# Patient Record
Sex: FEMALE | Race: BLACK OR AFRICAN AMERICAN | Employment: OTHER | URBAN - METROPOLITAN AREA
[De-identification: names, ages, dates, MRNs, and addresses within clinical notes are randomized per-mention and may not be internally consistent; named-entity substitution may affect disease eponyms.]

---

## 2018-04-17 ENCOUNTER — ANESTHESIA EVENT (OUTPATIENT)
Dept: ENDOSCOPY | Age: 73
End: 2018-04-17
Payer: MEDICARE

## 2018-04-17 RX ORDER — SODIUM CHLORIDE, SODIUM LACTATE, POTASSIUM CHLORIDE, CALCIUM CHLORIDE 600; 310; 30; 20 MG/100ML; MG/100ML; MG/100ML; MG/100ML
100 INJECTION, SOLUTION INTRAVENOUS CONTINUOUS
Status: CANCELLED | OUTPATIENT
Start: 2018-04-17

## 2018-04-18 ENCOUNTER — ANESTHESIA (OUTPATIENT)
Dept: ENDOSCOPY | Age: 73
End: 2018-04-18
Payer: MEDICARE

## 2018-05-22 RX ORDER — SODIUM CHLORIDE, SODIUM LACTATE, POTASSIUM CHLORIDE, CALCIUM CHLORIDE 600; 310; 30; 20 MG/100ML; MG/100ML; MG/100ML; MG/100ML
100 INJECTION, SOLUTION INTRAVENOUS CONTINUOUS
Status: CANCELLED | OUTPATIENT
Start: 2018-05-22

## 2018-05-23 ENCOUNTER — HOSPITAL ENCOUNTER (OUTPATIENT)
Age: 73
Setting detail: OUTPATIENT SURGERY
Discharge: HOME | End: 2018-05-23
Attending: INTERNAL MEDICINE | Admitting: INTERNAL MEDICINE
Payer: MEDICARE

## 2018-05-23 VITALS
HEIGHT: 62 IN | DIASTOLIC BLOOD PRESSURE: 65 MMHG | OXYGEN SATURATION: 100 % | SYSTOLIC BLOOD PRESSURE: 150 MMHG | WEIGHT: 122 LBS | BODY MASS INDEX: 22.45 KG/M2 | TEMPERATURE: 98 F | HEART RATE: 77 BPM | RESPIRATION RATE: 23 BRPM

## 2018-05-23 PROCEDURE — 74011250636 HC RX REV CODE- 250/636

## 2018-05-23 PROCEDURE — 76040000025: Performed by: INTERNAL MEDICINE

## 2018-05-23 PROCEDURE — 74011250636 HC RX REV CODE- 250/636: Performed by: ANESTHESIOLOGY

## 2018-05-23 PROCEDURE — 74011000250 HC RX REV CODE- 250

## 2018-05-23 PROCEDURE — 76060000032 HC ANESTHESIA 0.5 TO 1 HR: Performed by: INTERNAL MEDICINE

## 2018-05-23 PROCEDURE — 77030008969: Performed by: INTERNAL MEDICINE

## 2018-05-23 RX ORDER — SODIUM CHLORIDE 0.9 % (FLUSH) 0.9 %
5-10 SYRINGE (ML) INJECTION AS NEEDED
Status: DISCONTINUED | OUTPATIENT
Start: 2018-05-23 | End: 2018-05-23 | Stop reason: HOSPADM

## 2018-05-23 RX ORDER — PROPOFOL 10 MG/ML
INJECTION, EMULSION INTRAVENOUS AS NEEDED
Status: DISCONTINUED | OUTPATIENT
Start: 2018-05-23 | End: 2018-05-23 | Stop reason: HOSPADM

## 2018-05-23 RX ORDER — EPHEDRINE SULFATE 50 MG/ML
INJECTION, SOLUTION INTRAVENOUS AS NEEDED
Status: DISCONTINUED | OUTPATIENT
Start: 2018-05-23 | End: 2018-05-23 | Stop reason: HOSPADM

## 2018-05-23 RX ORDER — SODIUM CHLORIDE, SODIUM LACTATE, POTASSIUM CHLORIDE, CALCIUM CHLORIDE 600; 310; 30; 20 MG/100ML; MG/100ML; MG/100ML; MG/100ML
100 INJECTION, SOLUTION INTRAVENOUS CONTINUOUS
Status: DISCONTINUED | OUTPATIENT
Start: 2018-05-23 | End: 2018-05-23 | Stop reason: HOSPADM

## 2018-05-23 RX ORDER — PROPOFOL 10 MG/ML
INJECTION, EMULSION INTRAVENOUS
Status: DISCONTINUED | OUTPATIENT
Start: 2018-05-23 | End: 2018-05-23 | Stop reason: HOSPADM

## 2018-05-23 RX ADMIN — PROPOFOL 30 MG: 10 INJECTION, EMULSION INTRAVENOUS at 13:55

## 2018-05-23 RX ADMIN — PROPOFOL 40 MG: 10 INJECTION, EMULSION INTRAVENOUS at 13:52

## 2018-05-23 RX ADMIN — EPHEDRINE SULFATE 5 MG: 50 INJECTION, SOLUTION INTRAVENOUS at 14:20

## 2018-05-23 RX ADMIN — SODIUM CHLORIDE, SODIUM LACTATE, POTASSIUM CHLORIDE, AND CALCIUM CHLORIDE: 600; 310; 30; 20 INJECTION, SOLUTION INTRAVENOUS at 13:39

## 2018-05-23 RX ADMIN — EPHEDRINE SULFATE 5 MG: 50 INJECTION, SOLUTION INTRAVENOUS at 14:18

## 2018-05-23 RX ADMIN — SODIUM CHLORIDE, SODIUM LACTATE, POTASSIUM CHLORIDE, AND CALCIUM CHLORIDE 100 ML/HR: 600; 310; 30; 20 INJECTION, SOLUTION INTRAVENOUS at 12:36

## 2018-05-23 RX ADMIN — PROPOFOL 100 MCG/KG/MIN: 10 INJECTION, EMULSION INTRAVENOUS at 13:52

## 2018-05-23 NOTE — OP NOTES
Gastroenterology Procedure Note              Procedure:  Endoscopic ultrasound with Doppler     Date of Procedure:  5/23/2018    Patient:  Theodore Griffin     1945    Indication:  Dilated common bile duct    Sedation:  MAC    Pre-Procedure Physical Exam:    Mental status:  alert and oriented  Airway:  normal oropharyngeal airway and neck mobility  CV:  regular rate and rhythm  Respiratory:  clear to auscultation    Procedure:  A History and Physical has been performed, and patient medication allergies have been  reviewed. Risks of perforation, hemorrhage, adverse drug reaction, and aspiration were discussed. Informed consent was obtained for the procedure, including sedation. The patient was placed in the left lateral decubitus position. The heart rate, oxygen saturations, blood pressure, and response to care were monitored throughout the procedure. The linear echoendoscope was passed through the mouth and advanced under direct vision to the distal duodenum. As the scope was slowly withdrawn, detailed endoscopic images were obtained from the surrounding organs. The patient tolerated the procedure well. Findings:     ENDOSCOPIC FINDINGS:  Limited views of the mucosa with the echoendoscope reveals no gross abnormalities of the stomach or proximal duodenum. The ampulla is well-visualized endoscopically and appears normal.    PANCREAS:  The pancreas is well-visualized from head to tail. There is no evidence of chronic pancreatitis. No cysts or masses are visualized. The main pancreatic duct is of normal caliber with a smooth, regular course, measuring up to 3 mm in the head, 2 mm in the body and 1 mm in the tail. Pancreas divisum is not seen. BILIARY TREE: The gallbladder is normal.The common bile duct is well-visualized from its insertion in the ampulla to the bifurcation of right and left hepatic ducts.  It is dilated, measuring up to 12 mm maximally with an abrupt change in caliber distally and return to normal caliber just proximal to the ampulla. This may represent a tight angulation as there is no smooth stricture or extrinsic compression. There are no intraductal stones, sludge or debris. The ampulla appears normal endosonographically. OTHER ORGANS:  Views of the left lobe of the liver demonstrate intrahepatic biliary dilatation but no solid mass lesions. There is no ascites in the upper abdomen. The left adrenal gland appears normal. The major vascular structures including the portal vein, splenic vessels and celiac artery appear unremarkable. There are no pathologically enlarged posterior mediastinal or upper abdominal lymph nodes. Specimen:  No    Estimated Blood Loss:  None    Implant:  None           Impression:    1. Dilated common bile duct. There is an abrupt change in caliber without stone, mass or extrinsic compression. This could represent a focal stricture or a tight angulation. Plan:  1. Resume diet and current medications. 2. Will check LFTs at follow up. 3. Return to office in 1-2 months for ongoing care.     Signed:  Janell Keating MD  5/23/2018  2:20 PM

## 2018-05-23 NOTE — H&P
History and Physical for Endoscopic Ultrasound             Date: 5/23/2018     History of Present Illness:  Patient presents to undergo endoscopic ultrasound for evaluation of a dilated common bile duct seen on abdominal imaging. Patient denies any diarrhea, constipation, visible GI bleeding, fevers or chills. Past Medical History:   Diagnosis Date    Anticoagulated on Coumadin     Arrhythmia     a- fib-- followed by dr. Earl Alvares--- in [place]    Borderline diabetes     sqbs avg am----- diet controlled --- no meds    GERD (gastroesophageal reflux disease)     controlled with med    Hypercholesterolemia     Hypertension     controlled with med    Self-catheterizes urinary bladder     at least 2 times daily    Urinary retention     since back surg 2/2018-- has to self cath     Past Surgical History:   Procedure Laterality Date    HX DILATION AND CURETTAGE      HX LAP CHOLECYSTECTOMY  25 yrs ago    HX OTHER SURGICAL      egd and colonoscopy    HX TUBAL LIGATION      NEUROLOGICAL PROCEDURE UNLISTED  02/2018 at [facility]    back surg      No family history on file. Social History   Substance Use Topics    Smoking status: Never Smoker    Smokeless tobacco: Never Used    Alcohol use No        Allergies   Allergen Reactions    Flexeril [Cyclobenzaprine] Palpitations    Lortab [Hydrocodone-Acetaminophen] Palpitations    Phenergan [Promethazine] Other (comments)     hallucinates    Protonix [Pantoprazole] Other (comments)     dizziness    Tramadol Nausea and Vomiting    Tylenol [Acetaminophen] Palpitations     No current facility-administered medications for this encounter. Review of Systems:  A detailed 10 organ review of systems is obtained with pertinent positives as listed in the History of Present Illness. All others are negative. Objective:     Physical Exam:  There were no vitals taken for this visit. General:  Alert and oriented.   Heart: Regular rate and rhythm  Lungs: Clear to auscultation bilaterally  Abdomen: Soft, nontender, nondistended    Impression/Plan:     Proceed with EUS as planned. I have discussed with the patient the technique, benefits, alternatives, and risks of the procedure, including medication reaction, immediate or delayed bleeding, or perforation of the gastrointestinal tract.     Signed By: aCitlyn Landers MD     May 23, 2018

## 2018-05-23 NOTE — DISCHARGE INSTRUCTIONS
Gastrointestinal Esophagogastroduodenoscopy (EGD)/ Endoscopic Ultrasound(EUS)- Upper Exam Discharge Instructions    1. Call Dr. Matt Palmer  for any problems or questions. 2. Contact the doctor's office for follow up appointment as directed. 3. Medication may cause drowsiness for several hours, therefore, do not drive or operate machinery for remainder of the day. 4. No alcohol today. 5. Ordinarily, you may resume regular diet and activity after exam unless otherwise specified by your physician. 6. For mild soreness in your throat you may use Cepacol throat lozenges or warm  salt-water gargles as needed. Any additional instructions:   1. Resume diet and current medications. 2. Please call Dr. Chris Gill office to schedule a follow up visit for 1-2 months. Instructions given to Davian Pedersen and other family members.

## 2018-05-23 NOTE — ROUTINE PROCESS
VSS. Discharge instructions reviewed with patient and sister and copy of instructions sent home with patient. Dr. Sima Perales and Dr. Tori Wright spoke with patient and sister prior to discharge. Questions answered. Discharged via car, wheeled out by [employer]. IV discontinued prior to discharge. Personal items with patient at discharge: clothing, dentures, and glasses.

## 2018-05-23 NOTE — IP AVS SNAPSHOT
[address] 
[phone] Patient: Connor Corcoran MRN: [id number] About your hospitalization You were admitted on:  May 23, 2018 You last received care in the:  [facility] You were discharged on:  May 23, 2018 Why you were hospitalized Your primary diagnosis was:  Not on File Follow-up Information None Discharge Orders None A check atilio indicates which time of day the medication should be taken. My Medications ASK your doctor about these medications Instructions Each Dose to Equal  
 Morning Noon Evening Bedtime  
 amLODIPine 5 mg tablet Commonly known as:  Amanuel Sly Your last dose was: Your next dose is: Take 5 mg by mouth nightly. Takes every other evening 5 mg  
    
   
   
   
  
 aspirin 81 mg chewable tablet Your last dose was: Your next dose is: Take 81 mg by mouth daily. 81 mg  
    
   
   
   
  
 COLACE 100 mg capsule Generic drug:  docusate sodium Your last dose was: Your next dose is: Take 200 mg by mouth nightly. 200 mg  
    
   
   
   
  
 hydroCHLOROthiazide 12.5 mg tablet Commonly known as:  HYDRODIURIL Your last dose was: Your next dose is: Take 12.5 mg by mouth daily. 12.5 mg  
    
   
   
   
  
 LIPITOR 20 mg tablet Generic drug:  atorvastatin Your last dose was: Your next dose is: Take 20 mg by mouth daily. 20 mg MIRALAX 17 gram/dose powder Generic drug:  polyethylene glycol Your last dose was: Your next dose is: Take 17 g by mouth daily. 17 g  
    
   
   
   
  
 tamsulosin 0.4 mg capsule Commonly known as:  FLOMAX Your last dose was: Your next dose is: Take 0.4 mg by mouth daily.   
 0.4 mg  
 TOPROL  mg tablet Generic drug:  metoprolol succinate Your last dose was: Your next dose is: Take 100 mg by mouth daily. 100 mg  
    
   
   
   
  
 warfarin 5 mg tablet Commonly known as:  COUMADIN Your last dose was: Your next dose is: Take 5 mg by mouth nightly. Takes 5 mg daily except on mon and fri then she takes 7.5 mg---- stopped 5/18/18 per dr Mert Gil 5 mg Discharge Instructions Gastrointestinal Esophagogastroduodenoscopy (EGD)/ Endoscopic Ultrasound(EUS)- Upper Exam Discharge Instructions 1. Call Dr. Brad Rivera  for any problems or questions. 2. Contact the doctor's office for follow up appointment as directed. 3. Medication may cause drowsiness for several hours, therefore, do not drive or operate machinery for remainder of the day. 4. No alcohol today. 5. Ordinarily, you may resume regular diet and activity after exam unless otherwise specified by your physician. 6. For mild soreness in your throat you may use Cepacol throat lozenges or warm  salt-water gargles as needed. Any additional instructions: 1. Resume diet and current medications. 2. Please call Dr. Hugo Phalen office to schedule a follow up visit for 1-2 months. Instructions given to Killian Hermosillo and other family members. Introducing [facility]! [facility] introduces [employer] patient portal. Now you can access parts of your medical record, email your doctor's office, and request medication refills online. 1. In your internet browser, go to [email or web address] 2. Click on the First Time User? Click Here link in the Sign In box. You will see the New Member Sign Up page. 3. Enter your [employer]os Access Code exactly as it appears below. You will not need to use this code after youve completed the sign-up process.  If you do not sign up before the expiration date, you must request a new code. · Sensser Access Code: [email or web address] Expires: 7/15/2018 12:43 PM 
 
4. Enter the last four digits of your Social Security Number (xxxx) and Date of Birth (mm/dd/yyyy) as indicated and click Submit. You will be taken to the next sign-up page. 5. Create a [employer]t ID. This will be your Sensser login ID and cannot be changed, so think of one that is secure and easy to remember. 6. Create a Sensser password. You can change your password at any time. 7. Enter your Password Reset Question and Answer. This can be used at a later time if you forget your password. 8. Enter your e-mail address. You will receive e-mail notification when new information is available in [address] 9. Click Sign Up. You can now view and download portions of your medical record. 10. Click the Download Summary menu link to download a portable copy of your medical information. If you have questions, please visit the Frequently Asked Questions section of the Sensser website. Remember, Sensser is NOT to be used for urgent needs. For medical emergencies, dial 911. Now available from your iPhone and Android! Introducing Adonis Moore As a [facility] patient, I wanted to make you aware of our electronic visit tool called Adonis Johnnyorlandolucia. [facility] 24/7 allows you to connect within minutes with a medical provider 24 hours a day, seven days a week via a mobile device or tablet or logging into a secure website from your computer. You can access Adonis Moore from anywhere in the [place].  
 
A virtual visit might be right for you when you have a simple condition and feel like you just dont want to get out of bed, or cant get away from work for an appointment, when your regular [facility] provider is not available (evenings, weekends or holidays), or when youre out of town and need minor care. Electronic visits cost only $49 and if the [employer] 24/7 provider determines a prescription is needed to treat your condition, one can be electronically transmitted to a nearby pharmacy*. Please take a moment to enroll today if you have not already done so. The enrollment process is free and takes just a few minutes. To enroll, please download the [employer] 24/7 myles to your tablet or phone, or visit [email or web address] to enroll on your computer. And, as an [address] [place] [address] patient with a [employer] account, the results of your visits will be scanned into your electronic medical record and your primary care provider will be able to view the scanned results. We urge you to continue to see your regular [employer] provider for your ongoing medical care. And while your primary care provider may not be the one available when you seek a [employer] virtual visit, the peace of mind you get from getting a real diagnosis real time can be priceless. For more information on [employer], view our Frequently Asked Questions (FAQs) at [email or web address]. Sincerely, 
 
Chel Macias MD 
Chief Medical Officer [facility] Carli Naidu *:  certain medications cannot be prescribed via [employer] Providers Seen During Your Hospitalization Provider Specialty Primary office phone Zenaida Sapp MD Gastroenterology [phone] Your Primary Care Physician (PCP) Primary Care Physician Office Phone Office Fax OTHER, PHYS ** None ** ** None ** You are allergic to the following Allergen Reactions Flexeril (Cyclobenzaprine) Palpitations Lortab (Hydrocodone-Acetaminophen) Palpitations Phenergan (Promethazine) Other (comments)  
 hallucinates Protonix (Pantoprazole) Other (comments)  
 dizziness Tramadol Nausea and Vomiting Tylenol (Acetaminophen) Palpitations Recent Documentation Height Weight Breastfeeding? BMI OB Status Smoking Status 1.575 m 55.3 kg No 22.31 kg/m2 Menopause Never Smoker Emergency Contacts Name Discharge Info Relation Home Work Mobile [facility]  Daughter [21] [phone] George Lassiter  Sister [23] [phone] Patient Belongings The following personal items are in your possession at time of discharge: 
  Dental Appliances: Uppers, Lowers  Visual Aid: Glasses Please provide this summary of care documentation to your next provider. Signatures-by signing, you are acknowledging that this After Visit Summary has been reviewed with you and you have received a copy. Patient Signature:  ____________________________________________________________ Date:  ____________________________________________________________  
  
Walter Cart Provider Signature:  ____________________________________________________________ Date:  ____________________________________________________________

## 2018-05-23 NOTE — ANESTHESIA PREPROCEDURE EVALUATION
Anesthetic History   No history of anesthetic complications            Review of Systems / Medical History  Pertinent labs reviewed    Pulmonary  Within defined limits                 Neuro/Psych   Within defined limits           Cardiovascular    Hypertension        Dysrhythmias : atrial fibrillation      Exercise tolerance: <4 METS     GI/Hepatic/Renal     GERD: well controlled           Endo/Other    Diabetes (diet controlled)         Other Findings            Physical Exam    Airway  Mallampati: II  TM Distance: 4 - 6 cm  Neck ROM: normal range of motion   Mouth opening: Normal     Cardiovascular  Regular rate and rhythm,  S1 and S2 normal,  no murmur, click, rub, or gallop             Dental    Dentition: Upper partial plate and Lower partial plate     Pulmonary  Breath sounds clear to auscultation               Abdominal  GI exam deferred       Other Findings            Anesthetic Plan    ASA: 2  Anesthesia type: total IV anesthesia          Induction: Intravenous  Anesthetic plan and risks discussed with: Patient

## 2018-05-23 NOTE — IP AVS SNAPSHOT
Summary of Care Report The Summary of Care report has been created to help improve care coordination. Users with access to [employer] or [address] (Web-based application) may access additional patient information including the Discharge Summary. If you are not currently a [address] user and need more information, please call the number listed below in the [place] [address] section and ask to be connected with Medical Records. Facility Information Name Address Phone [address] [address] [address] [phone] Patient Information Patient Name Sex JESSI Lawrence ([id number]) Female 1945 Discharge Information Admitting Provider Service Area Unit Ariane Chappell MD / [facility] Endoscopy / [phone] Discharge Provider Discharge Date/Time Discharge Disposition Destination (none) (none) (none) (none) Patient Language Language ENGLISH [13] You are allergic to the following Allergen Reactions Flexeril (Cyclobenzaprine) Palpitations Lortab (Hydrocodone-Acetaminophen) Palpitations Phenergan (Promethazine) Other (comments)  
 hallucinates Protonix (Pantoprazole) Other (comments)  
 dizziness Tramadol Nausea and Vomiting Tylenol (Acetaminophen) Palpitations Current Discharge Medication List  
  
ASK your doctor about these medications Dose & Instructions Dispensing Information Comments  
 amLODIPine 5 mg tablet Commonly known as:  Erenest Kate Dose:  5 mg Take 5 mg by mouth nightly. Takes every other evening Refills:  0  
   
 aspirin 81 mg chewable tablet Dose:  81 mg Take 81 mg by mouth daily. Refills:  0  
   
 COLACE 100 mg capsule Generic drug:  docusate sodium Dose:  200 mg Take 200 mg by mouth nightly. Refills:  0 hydroCHLOROthiazide 12.5 mg tablet Commonly known as:  HYDRODIURIL Dose:  12.5 mg Take 12.5 mg by mouth daily. Refills:  0 LIPITOR 20 mg tablet Generic drug:  atorvastatin Dose:  20 mg Take 20 mg by mouth daily. Refills:  0 MIRALAX 17 gram/dose powder Generic drug:  polyethylene glycol Dose:  17 g Take 17 g by mouth daily. Refills:  0  
   
 tamsulosin 0.4 mg capsule Commonly known as:  FLOMAX Dose:  0.4 mg Take 0.4 mg by mouth daily. Refills:  0  
   
 TOPROL  mg tablet Generic drug:  metoprolol succinate Dose:  100 mg Take 100 mg by mouth daily. Refills:  0  
   
 warfarin 5 mg tablet Commonly known as:  COUMADIN Dose:  5 mg Take 5 mg by mouth nightly. Takes 5 mg daily except on mon and fri then she takes 7.5 mg---- stopped 5/18/18 per dr Jung Rao Refills:  0 Surgery Information ID Date/Time Status Primary Surgeon All Procedures Location [id number] 5/23/2018 1300 Unposted Anthony Snyder MD ENDOSCOPIC ULTRASOUND (EUS)  BMI 22 [facility]    
 ENDOSCOPIC ULTRASOUND (EUS)  BMI 22:  EUS Follow-up Information None Discharge Instructions Gastrointestinal Esophagogastroduodenoscopy (EGD)/ Endoscopic Ultrasound(EUS)- Upper Exam Discharge Instructions 1. Call Dr. Sherly Jauregui  for any problems or questions. 2. Contact the doctor's office for follow up appointment as directed. 3. Medication may cause drowsiness for several hours, therefore, do not drive or operate machinery for remainder of the day. 4. No alcohol today. 5. Ordinarily, you may resume regular diet and activity after exam unless otherwise specified by your physician. 6. For mild soreness in your throat you may use Cepacol throat lozenges or warm  salt-water gargles as needed. Any additional instructions: 1. Resume diet and current medications. 2. Please call Dr. Candida Tavera office to schedule a follow up visit for 1-2 months. Instructions given to Anmol Richter and other family members. Chart Review Routing History No Routing History on File

## 2018-05-23 NOTE — ANESTHESIA POSTPROCEDURE EVALUATION
Post-Anesthesia Evaluation and Assessment    Patient: Anmol Richter MRN: [id number]  SSN: [id number]    YOB: 1945  Age: 74 y.o. Sex: female       Cardiovascular Function/Vital Signs  Visit Vitals    /65    Pulse 77    Temp 36.7 °C (98 °F)    Resp 23    Ht 5' 2\" (1.575 m)    Wt 55.3 kg (122 lb)    SpO2 100%    Breastfeeding No    BMI 22.31 kg/m2       Patient is status post total IV anesthesia anesthesia for Procedure(s):  ENDOSCOPIC ULTRASOUND (EUS)  BMI 22. Nausea/Vomiting: None    Postoperative hydration reviewed and adequate. Pain:  Pain Scale 1: Numeric (0 - 10) (05/23/18 1453)  Pain Intensity 1: 0 (05/23/18 1453)   Managed    Neurological Status: At baseline    Mental Status and Level of Consciousness: Awake. Pulmonary Status:   O2 Device: Room air (05/23/18 1453)   Adequate oxygenation and airway patent    Complications related to anesthesia: None    Post-anesthesia assessment completed.  No concerns    Signed By: Abdoulaye Munguia MD     May 23, 2018

## (undated) DEVICE — CONNECTOR TBNG OD5-7MM O2 END DISP

## (undated) DEVICE — BLLN KT O RING ENDOSCP US --

## (undated) DEVICE — CANNULA NSL ORAL AD FOR CAPNOFLEX CO2 O2 AIRLFE

## (undated) DEVICE — MOUTHPIECE ENDOSCP 20X27MM --

## (undated) DEVICE — AIRLIFE™ OXYGEN TUBING 7 FEET (2.1 M) CRUSH RESISTANT OXYGEN TUBING, VINYL TIPPED: Brand: AIRLIFE™